# Patient Record
Sex: MALE | Race: WHITE | NOT HISPANIC OR LATINO | URBAN - METROPOLITAN AREA
[De-identification: names, ages, dates, MRNs, and addresses within clinical notes are randomized per-mention and may not be internally consistent; named-entity substitution may affect disease eponyms.]

---
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
## 2025-08-08 ENCOUNTER — TELEPHONE (OUTPATIENT)
Dept: URGENT CARE | Facility: PHYSICIAN GROUP | Age: 20
End: 2025-08-08

## 2025-08-08 ENCOUNTER — OFFICE VISIT (OUTPATIENT)
Dept: URGENT CARE | Facility: PHYSICIAN GROUP | Age: 20
End: 2025-08-08
Payer: COMMERCIAL

## 2025-08-08 ENCOUNTER — HOSPITAL ENCOUNTER (OUTPATIENT)
Dept: RADIOLOGY | Facility: MEDICAL CENTER | Age: 20
End: 2025-08-08
Attending: PHYSICIAN ASSISTANT
Payer: COMMERCIAL

## 2025-08-08 VITALS
HEIGHT: 65 IN | RESPIRATION RATE: 16 BRPM | BODY MASS INDEX: 35.24 KG/M2 | HEART RATE: 106 BPM | TEMPERATURE: 98.9 F | WEIGHT: 211.5 LBS | DIASTOLIC BLOOD PRESSURE: 68 MMHG | OXYGEN SATURATION: 94 % | SYSTOLIC BLOOD PRESSURE: 116 MMHG

## 2025-08-08 DIAGNOSIS — N50.89 SCROTAL MASS: Primary | ICD-10-CM

## 2025-08-08 DIAGNOSIS — N50.89 SCROTAL MASS: ICD-10-CM

## 2025-08-08 PROCEDURE — 76870 US EXAM SCROTUM: CPT

## 2025-08-08 PROCEDURE — 3078F DIAST BP <80 MM HG: CPT | Performed by: PHYSICIAN ASSISTANT

## 2025-08-08 PROCEDURE — 99214 OFFICE O/P EST MOD 30 MIN: CPT | Performed by: PHYSICIAN ASSISTANT

## 2025-08-08 PROCEDURE — 3074F SYST BP LT 130 MM HG: CPT | Performed by: PHYSICIAN ASSISTANT

## 2025-08-08 RX ORDER — CARIPRAZINE 1.5 MG/1
1.5 CAPSULE, GELATIN COATED ORAL DAILY
COMMUNITY

## 2025-08-08 RX ORDER — VENLAFAXINE HCL 37.5 MG
CAPSULE, EXT RELEASE 24 HR ORAL
COMMUNITY
Start: 2025-08-04

## 2025-08-09 DIAGNOSIS — L72.9 SCROTAL CYST: Primary | ICD-10-CM

## 2025-08-14 ENCOUNTER — OFFICE VISIT (OUTPATIENT)
Dept: UROLOGY | Facility: MEDICAL CENTER | Age: 20
End: 2025-08-14
Payer: COMMERCIAL

## 2025-08-14 DIAGNOSIS — L72.3 SCROTAL SEBACEOUS CYST: Primary | ICD-10-CM

## 2025-08-14 PROCEDURE — 99203 OFFICE O/P NEW LOW 30 MIN: CPT | Performed by: UROLOGY
